# Patient Record
Sex: MALE | Race: BLACK OR AFRICAN AMERICAN
[De-identification: names, ages, dates, MRNs, and addresses within clinical notes are randomized per-mention and may not be internally consistent; named-entity substitution may affect disease eponyms.]

---

## 2020-02-01 ENCOUNTER — HOSPITAL ENCOUNTER (EMERGENCY)
Dept: HOSPITAL 87 - ER | Age: 35
LOS: 1 days | Discharge: HOME | End: 2020-02-02
Payer: COMMERCIAL

## 2020-02-01 VITALS — HEIGHT: 72 IN | WEIGHT: 187.39 LBS | BODY MASS INDEX: 25.38 KG/M2

## 2020-02-01 DIAGNOSIS — R05: Primary | ICD-10-CM

## 2020-02-01 PROCEDURE — 99283 EMERGENCY DEPT VISIT LOW MDM: CPT

## 2020-02-02 VITALS — DIASTOLIC BLOOD PRESSURE: 81 MMHG | SYSTOLIC BLOOD PRESSURE: 117 MMHG

## 2020-08-27 ENCOUNTER — EMERGENCY (EMERGENCY)
Facility: HOSPITAL | Age: 35
LOS: 1 days | Discharge: HOME | End: 2020-08-27
Admitting: EMERGENCY MEDICINE
Payer: MEDICAID

## 2020-08-27 VITALS
HEIGHT: 70 IN | SYSTOLIC BLOOD PRESSURE: 129 MMHG | OXYGEN SATURATION: 98 % | TEMPERATURE: 98 F | WEIGHT: 190.04 LBS | DIASTOLIC BLOOD PRESSURE: 85 MMHG | RESPIRATION RATE: 16 BRPM | HEART RATE: 71 BPM

## 2020-08-27 DIAGNOSIS — G44.209 TENSION-TYPE HEADACHE, UNSPECIFIED, NOT INTRACTABLE: ICD-10-CM

## 2020-08-27 DIAGNOSIS — R51 HEADACHE: ICD-10-CM

## 2020-08-27 DIAGNOSIS — F17.200 NICOTINE DEPENDENCE, UNSPECIFIED, UNCOMPLICATED: ICD-10-CM

## 2020-08-27 DIAGNOSIS — F41.9 ANXIETY DISORDER, UNSPECIFIED: ICD-10-CM

## 2020-08-27 PROBLEM — Z00.00 ENCOUNTER FOR PREVENTIVE HEALTH EXAMINATION: Status: ACTIVE | Noted: 2020-08-27

## 2020-08-27 PROCEDURE — 99053 MED SERV 10PM-8AM 24 HR FAC: CPT

## 2020-08-27 PROCEDURE — 99283 EMERGENCY DEPT VISIT LOW MDM: CPT

## 2020-08-27 RX ORDER — FLUOXETINE HCL 10 MG
10 CAPSULE ORAL ONCE
Refills: 0 | Status: COMPLETED | OUTPATIENT
Start: 2020-08-27 | End: 2020-08-27

## 2020-08-27 RX ORDER — ACETAMINOPHEN 500 MG
650 TABLET ORAL ONCE
Refills: 0 | Status: COMPLETED | OUTPATIENT
Start: 2020-08-27 | End: 2020-08-27

## 2020-08-27 RX ORDER — METOCLOPRAMIDE HCL 10 MG
10 TABLET ORAL ONCE
Refills: 0 | Status: COMPLETED | OUTPATIENT
Start: 2020-08-27 | End: 2020-08-27

## 2020-08-27 RX ADMIN — Medication 10 MILLIGRAM(S): at 06:56

## 2020-08-27 RX ADMIN — Medication 650 MILLIGRAM(S): at 06:55

## 2020-08-27 RX ADMIN — Medication 10 MILLIGRAM(S): at 06:55

## 2020-08-27 NOTE — ED PROVIDER NOTE - PATIENT PORTAL LINK FT
You can access the FollowMyHealth Patient Portal offered by [facility] by registering at the following website: [email or web address]. By joining [employer]’s FollowMyHealth portal, you will also be able to view your health information using other applications (apps) compatible with our system.

## 2020-08-27 NOTE — ED PROVIDER NOTE - CARE PROVIDER_API CALL
KAROL MLAONE  INTERNAL MEDICINE  [address] [address]  Phone: [phone]  Fax: [phone]  Follow Up Time:     Roya Ba)  Neurology; Vascular Neurology  [address], [address] [place] [id number]  Phone: [phone]  Fax: [phone]  Follow Up Time:

## 2020-08-27 NOTE — ED PROVIDER NOTE - CLINICAL SUMMARY MEDICAL DECISION MAKING FREE TEXT BOX
pt p/w HA, nausea, and feeling anxious after getting off a long haul bus, no other systemic sx, afebrile and non toxic appearing, neurologically intact on exam, psychiatrically stable, counseling and outpt resources provided, pt with no acute trauma or emergencies noted and exam wnl.  hemodynamically stable and non toxic appearing, medically stable for dc. f/u instructions have been provided

## 2020-08-27 NOTE — ED PROVIDER NOTE - NSFOLLOWUPINSTRUCTIONS_ED_ALL_ED_FT
Follow up with your primary care doctor or clinics listed below if you do not have a doctor,    [address] [address]  To make an appointment, call [phone]    [facility]  Address: [address] [place] [address]  Appointment Center: [phone] ([phone])     Return immediately for any new or worsening symptoms or any new concerns

## 2020-08-27 NOTE — ED PROVIDER NOTE - CARE PROVIDERS DIRECT ADDRESSES
[email or web address]

## 2020-08-27 NOTE — ED PROVIDER NOTE - PHYSICAL EXAMINATION
Gen - WDWN M, NAD, comfortable and non-toxic appearing  Skin - warm, dry, intact   HEENT - AT/NC, PERRL, EOMI, no conjunctival injection b/l, o/p clear without erythema, edema, uvula midline, airway patent, neck supple, no palpable nodes   CV - S1S2, R/R/R  Resp - CTAB, no r/r/w  GI - soft, ND, NT, no CVAT b/l   MS - w/w/p, no c/c/e  Psych - euphoric, mildly anxious appearing, stuttering speech, normal speech and eye contact, judgement and insight intact, no SI/HI/AH/VH/delusion   Neuro - AxOx3, no focal neuro deficits, CN II-XII grossly intact, negative nystagmus, ambulatory without gait disturbance

## 2020-08-27 NOTE — ED PROVIDER NOTE - OBJECTIVE STATEMENT
34 yo M with PMHx of anxiety, tension HA, currently undomiciled, presenting c/o HA, nausea, and feeling anxious x few hours.  Pt states that he has been traveling by bus from [place] [facility] and has been having increased anxiety with tension sensation across his head after getting off the bus this morning.  Admits to mild nausea and insomnia.  Denies SI/HI/AH/VH/delusion, mood swings, dizziness, CP, SOB, palpitations, V/D/C, abdominal pain, change in urinary/bowel function, tremors, focal weakness, and fever/chills. States usually takes xanax 1mg for anxiety

## 2020-08-27 NOTE — ED PROVIDER NOTE - CCCP TRG CHIEF CMPLNT
Patient arrives to ED with c/o GLF in the bathroom, patient arrives holding his right side/flank, states he does not know what he hit, patient states he did not hit his head, no LOC noted. Patient smell of ETOH.
headache
